# Patient Record
Sex: FEMALE | Race: WHITE | NOT HISPANIC OR LATINO | Employment: FULL TIME | URBAN - METROPOLITAN AREA
[De-identification: names, ages, dates, MRNs, and addresses within clinical notes are randomized per-mention and may not be internally consistent; named-entity substitution may affect disease eponyms.]

---

## 2018-01-02 PROBLEM — E78.1 HYPERTRIGLYCERIDEMIA: Status: ACTIVE | Noted: 2018-01-02

## 2018-07-16 PROBLEM — E55.9 VITAMIN D DEFICIENCY: Status: ACTIVE | Noted: 2018-07-16

## 2018-07-16 PROBLEM — E88.819 INSULIN RESISTANCE: Status: ACTIVE | Noted: 2018-07-16

## 2018-07-16 PROBLEM — K21.9 GASTROESOPHAGEAL REFLUX DISEASE: Status: ACTIVE | Noted: 2018-07-16

## 2018-07-16 PROBLEM — I10 ESSENTIAL HYPERTENSION, BENIGN: Status: ACTIVE | Noted: 2018-07-16

## 2018-07-16 PROBLEM — Z11.59 ENCOUNTER FOR HEPATITIS C SCREENING TEST FOR LOW RISK PATIENT: Status: ACTIVE | Noted: 2018-07-16

## 2018-07-16 PROBLEM — Z00.00 ANNUAL PHYSICAL EXAM: Status: ACTIVE | Noted: 2018-07-16

## 2018-07-16 PROBLEM — Z00.00 ROUTINE GENERAL MEDICAL EXAMINATION AT A HEALTH CARE FACILITY: Status: ACTIVE | Noted: 2018-07-16

## 2018-07-30 PROBLEM — E03.9 ACQUIRED HYPOTHYROIDISM: Status: ACTIVE | Noted: 2018-07-30

## 2018-09-25 ENCOUNTER — TELEPHONE (OUTPATIENT)
Dept: RADIOLOGY | Facility: HOSPITAL | Age: 58
End: 2018-09-25

## 2018-09-26 ENCOUNTER — HOSPITAL ENCOUNTER (OUTPATIENT)
Dept: RADIOLOGY | Facility: HOSPITAL | Age: 58
Discharge: HOME | End: 2018-09-26
Attending: INTERNAL MEDICINE
Payer: COMMERCIAL

## 2018-09-26 DIAGNOSIS — G54.2 CERVICAL SYNDROME: ICD-10-CM

## 2018-09-26 DIAGNOSIS — G62.9 NEUROPATHY: ICD-10-CM

## 2018-10-15 PROBLEM — Z00.00 ANNUAL PHYSICAL EXAM: Status: RESOLVED | Noted: 2018-07-16 | Resolved: 2018-10-15

## 2018-10-15 PROBLEM — Z00.00 ROUTINE GENERAL MEDICAL EXAMINATION AT A HEALTH CARE FACILITY: Status: RESOLVED | Noted: 2018-07-16 | Resolved: 2018-10-15

## 2018-11-07 PROBLEM — Z01.818 PREOPERATIVE CLEARANCE: Status: ACTIVE | Noted: 2018-11-07

## 2018-11-07 PROBLEM — G54.2 CERVICAL SYNDROME: Status: ACTIVE | Noted: 2018-11-07

## 2018-11-07 PROBLEM — J40 BRONCHITIS: Status: ACTIVE | Noted: 2018-11-07

## 2018-11-07 PROBLEM — I24.0 BLOCKAGE OF CORONARY ARTERY OF HEART: Status: ACTIVE | Noted: 2018-11-07

## 2019-05-29 PROBLEM — B37.2 YEAST DERMATITIS: Status: ACTIVE | Noted: 2019-05-29

## 2019-06-24 PROBLEM — J01.00 ACUTE NON-RECURRENT MAXILLARY SINUSITIS: Status: ACTIVE | Noted: 2019-06-24

## 2019-08-01 PROBLEM — Z01.818 PREOPERATIVE CLEARANCE: Status: RESOLVED | Noted: 2018-11-07 | Resolved: 2019-08-01

## 2019-08-01 PROBLEM — K21.9 GASTROESOPHAGEAL REFLUX DISEASE: Status: RESOLVED | Noted: 2018-07-16 | Resolved: 2019-08-01

## 2019-08-11 PROBLEM — J01.00 ACUTE NON-RECURRENT MAXILLARY SINUSITIS: Status: RESOLVED | Noted: 2019-06-24 | Resolved: 2019-08-11

## 2019-08-11 PROBLEM — E83.52 HYPERCALCEMIA: Status: ACTIVE | Noted: 2019-08-11

## 2019-08-11 PROBLEM — J40 BRONCHITIS: Status: RESOLVED | Noted: 2018-11-07 | Resolved: 2019-08-11

## 2019-08-11 PROBLEM — Z11.59 ENCOUNTER FOR HEPATITIS C SCREENING TEST FOR LOW RISK PATIENT: Status: RESOLVED | Noted: 2018-07-16 | Resolved: 2019-08-11

## 2020-04-09 PROBLEM — R80.9 ALBUMINURIA: Status: ACTIVE | Noted: 2020-04-09

## 2021-04-28 ENCOUNTER — PATIENT MESSAGE (OUTPATIENT)
Dept: RESEARCH | Facility: HOSPITAL | Age: 61
End: 2021-04-28

## 2022-04-01 PROBLEM — K76.0 FATTY LIVER: Status: ACTIVE | Noted: 2022-04-01

## 2022-12-19 PROBLEM — N18.2 STAGE 2 CHRONIC KIDNEY DISEASE: Status: ACTIVE | Noted: 2022-12-19

## 2023-03-30 PROBLEM — R80.9 ALBUMINURIA: Status: RESOLVED | Noted: 2020-04-09 | Resolved: 2023-03-30

## 2023-03-30 PROBLEM — E83.52 HYPERCALCEMIA: Status: RESOLVED | Noted: 2019-08-11 | Resolved: 2023-03-30

## 2023-07-26 PROBLEM — E11.8 TYPE 2 DIABETES MELLITUS WITH UNSPECIFIED COMPLICATIONS: Status: ACTIVE | Noted: 2023-07-26

## 2023-07-26 PROBLEM — E66.01 MORBID OBESITY: Status: ACTIVE | Noted: 2023-07-26

## 2023-07-26 PROBLEM — N18.31 CHRONIC KIDNEY DISEASE, STAGE 3A: Status: ACTIVE | Noted: 2023-07-26

## 2024-04-16 PROBLEM — E11.22 TYPE 2 DIABETES MELLITUS WITH STAGE 3A CHRONIC KIDNEY DISEASE, WITHOUT LONG-TERM CURRENT USE OF INSULIN: Status: ACTIVE | Noted: 2023-07-26

## 2024-04-16 PROBLEM — N18.31 TYPE 2 DIABETES MELLITUS WITH STAGE 3A CHRONIC KIDNEY DISEASE, WITHOUT LONG-TERM CURRENT USE OF INSULIN: Status: ACTIVE | Noted: 2023-07-26

## 2024-04-23 PROBLEM — Z98.1 S/P CERVICAL SPINAL FUSION: Status: ACTIVE | Noted: 2023-08-11

## 2024-04-23 PROBLEM — N18.31 TYPE 2 DIABETES MELLITUS WITH STAGE 3A CHRONIC KIDNEY DISEASE, WITHOUT LONG-TERM CURRENT USE OF INSULIN: Status: RESOLVED | Noted: 2023-07-26 | Resolved: 2024-04-23

## 2024-04-23 PROBLEM — E11.22 TYPE 2 DIABETES MELLITUS WITH STAGE 3A CHRONIC KIDNEY DISEASE, WITHOUT LONG-TERM CURRENT USE OF INSULIN: Status: RESOLVED | Noted: 2023-07-26 | Resolved: 2024-04-23

## 2024-06-03 PROBLEM — M54.17 LUMBOSACRAL RADICULOPATHY: Status: ACTIVE | Noted: 2019-08-20

## 2024-06-03 PROBLEM — J30.9 ALLERGIC RHINITIS: Status: ACTIVE | Noted: 2024-06-03

## 2024-08-16 ENCOUNTER — HOSPITAL ENCOUNTER (OUTPATIENT)
Dept: RADIOLOGY | Facility: HOSPITAL | Age: 64
Discharge: HOME | End: 2024-08-16
Attending: INTERNAL MEDICINE
Payer: COMMERCIAL

## 2024-08-16 DIAGNOSIS — R07.9 ACUTE CHEST PAIN: ICD-10-CM

## 2024-08-16 DIAGNOSIS — R07.9 ACUTE CHEST PAIN: Primary | ICD-10-CM

## 2024-08-16 PROCEDURE — 71275 CT ANGIOGRAPHY CHEST: CPT | Mod: TC

## 2024-08-16 PROCEDURE — 25500020 PHARM REV CODE 255: Performed by: INTERNAL MEDICINE

## 2024-08-16 PROCEDURE — 71275 CT ANGIOGRAPHY CHEST: CPT | Mod: 26,,, | Performed by: STUDENT IN AN ORGANIZED HEALTH CARE EDUCATION/TRAINING PROGRAM

## 2024-08-16 RX ADMIN — IOHEXOL 100 ML: 350 INJECTION, SOLUTION INTRAVENOUS at 08:08

## 2024-08-25 PROBLEM — R91.1 PULMONARY NODULE LESS THAN 6 MM IN DIAMETER WITH LOW RISK FOR MALIGNANT NEOPLASM: Status: ACTIVE | Noted: 2024-08-25

## 2024-08-25 PROBLEM — Z91.89 PULMONARY NODULE LESS THAN 6 MM IN DIAMETER WITH LOW RISK FOR MALIGNANT NEOPLASM: Status: ACTIVE | Noted: 2024-08-25

## 2024-08-25 NOTE — PROGRESS NOTES
Erick,  The CT is normal  There is a small pulmonary nodule that does not need further evaluation  How are your symptoms?

## 2024-09-04 ENCOUNTER — PATIENT MESSAGE (OUTPATIENT)
Dept: PULMONOLOGY | Facility: CLINIC | Age: 64
End: 2024-09-04
Payer: COMMERCIAL

## 2024-09-13 ENCOUNTER — TELEPHONE (OUTPATIENT)
Dept: PULMONOLOGY | Facility: CLINIC | Age: 64
End: 2024-09-13
Payer: COMMERCIAL

## 2024-09-13 DIAGNOSIS — Z91.89 PULMONARY NODULE LESS THAN 6 MM IN DIAMETER WITH LOW RISK FOR MALIGNANT NEOPLASM: Primary | ICD-10-CM

## 2024-09-13 DIAGNOSIS — R06.02 SOB (SHORTNESS OF BREATH): ICD-10-CM

## 2024-09-13 DIAGNOSIS — R91.1 PULMONARY NODULE LESS THAN 6 MM IN DIAMETER WITH LOW RISK FOR MALIGNANT NEOPLASM: Primary | ICD-10-CM

## 2024-09-13 NOTE — TELEPHONE ENCOUNTER
Called pt to schedule PFT prior to pts appt with pulmonary provider, Pt wanted same day appt for pft as pulm appt and to keep pulm appt as it was scheduled, only availability for PFT would be at 08:30am and pts appt time was at 10am, Pt asked how long PFT would take, I replied it should take about 45 minutes but can take a little longer sometimes depending on patient and setup and all, pt stated that was fine and she doesn't mind waiting

## 2024-09-25 NOTE — PROGRESS NOTES
Chief complaint:  Chief Complaint   Patient presents with    Shortness of Breath    Wheezing        History of present illness -  Erick comes today complaining of upper respiratory symptoms.  She carries a diagnosis of asthma for the last 20+ years and has been managed with ICS/LABA and LTRA for many years, asthma history is not 100% convincing but possible.   She complained of runny nose and congestion as the main symptoms as well as the constant need to clear her throat, chest pain and pressure was also mentioned and has had steroids and empiric Abx in the past with some relief of her symptom.   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   At some point also was told by cardiology that she has vasospastic angina.        Symptoms: Cough and Wheezing  Risk factors: N/A  Known triggers: Dust  Apparent phenotype: Uncertain  Tobacco use: Lifetime non-smoker    Physical examination -   Physical Exam  Vitals and nursing note reviewed.   Constitutional:       Appearance: Normal appearance.   HENT:      Head: Normocephalic and atraumatic.      Nose: Nose normal.      Mouth/Throat:      Mouth: Mucous membranes are moist.   Eyes:      Pupils: Pupils are equal, round, and reactive to light.   Cardiovascular:      Rate and Rhythm: Normal rate and regular rhythm.      Pulses: Normal pulses.      Heart sounds: Normal heart sounds.   Pulmonary:      Effort: Pulmonary effort is normal.      Breath sounds: Normal breath sounds.   Abdominal:      General: Abdomen is flat.      Palpations: Abdomen is soft.   Musculoskeletal:         General: No swelling.   Skin:     General: Skin is warm.      Capillary Refill: Capillary refill takes less than 2 seconds.   Neurological:      General: No focal deficit present.      Mental Status: She is alert.        Vitals:    09/26/24 0915   BP: 126/78   Pulse: 70   Resp: 20   SpO2: 95%   Weight: 108.6 kg (239 lb 6.4  "oz)   Height: 5' 4" (1.626 m)      Review of Systems   Constitutional: Negative.    HENT:  Positive for congestion, sinus pain and sore throat.    Eyes: Negative.    Respiratory:  Positive for cough, shortness of breath and wheezing.    Cardiovascular: Negative.    Gastrointestinal: Negative.    Musculoskeletal: Negative.    Skin: Negative.    Neurological: Negative.        Relevant data (Independently reviewed by me) -    Prior notes -   PCP    Spirometry -  09/26/24 Basic physiology WNL     Imaging -   08/15/24 CXR and Chest CT normal maybe some thickened airways in the lower lung zones     Assessment & Plan    Upper airway cough syndrome    Wheezing  Comments:  likely due to RAD  oral steroids prescribed  continue advair and albuterol  acute uncontrolled  Orders:  -     Ambulatory referral/consult to Pulmonology  -     Ambulatory referral/consult to ENT; Future; Expected date: 10/03/2024    SOB (shortness of breath)  Comments:  acute uncontrolled  oral abx/steroids  Orders:  -     Ambulatory referral/consult to Pulmonology    Other orders  -     levocetirizine (XYZAL) 5 MG tablet; Take 1 tablet (5 mg total) by mouth every evening.  Dispense: 30 tablet; Refill: 11  -     azelastine (ASTELIN) 137 mcg (0.1 %) nasal spray; 1 spray (137 mcg total) by Nasal route 2 (two) times daily.  Dispense: 1 mL; Refill: 3    At this time her "asthma" seems well controlled, we will have to revisit this diagnosis at some point, perhaps methacholine challenge if the patient agrees.    She will see ENT for flexible upper airway scope, in the meantime she is to continue her antihistamine and ICS spray int he hopes that this will help her symptoms, her symptoms seem more related to upper airway as opposed to lower airway given the nature, lack of objective data pointing towards asthma and lack of improvement with prednisone burst     RTC in 3 months     Anthony Evans   09/26/2024    "

## 2024-09-26 ENCOUNTER — PATIENT MESSAGE (OUTPATIENT)
Dept: OTOLARYNGOLOGY | Facility: CLINIC | Age: 64
End: 2024-09-26
Payer: COMMERCIAL

## 2024-09-26 ENCOUNTER — CLINICAL SUPPORT (OUTPATIENT)
Dept: PULMONOLOGY | Facility: CLINIC | Age: 64
End: 2024-09-26
Payer: COMMERCIAL

## 2024-09-26 ENCOUNTER — OFFICE VISIT (OUTPATIENT)
Dept: PULMONOLOGY | Facility: CLINIC | Age: 64
End: 2024-09-26
Payer: COMMERCIAL

## 2024-09-26 VITALS
DIASTOLIC BLOOD PRESSURE: 78 MMHG | OXYGEN SATURATION: 95 % | RESPIRATION RATE: 20 BRPM | WEIGHT: 239.38 LBS | HEIGHT: 64 IN | SYSTOLIC BLOOD PRESSURE: 126 MMHG | HEART RATE: 70 BPM | BODY MASS INDEX: 40.87 KG/M2

## 2024-09-26 DIAGNOSIS — R05.8 UPPER AIRWAY COUGH SYNDROME: Primary | ICD-10-CM

## 2024-09-26 DIAGNOSIS — R06.02 SOB (SHORTNESS OF BREATH): ICD-10-CM

## 2024-09-26 DIAGNOSIS — Z91.89 PULMONARY NODULE LESS THAN 6 MM IN DIAMETER WITH LOW RISK FOR MALIGNANT NEOPLASM: ICD-10-CM

## 2024-09-26 DIAGNOSIS — R06.2 WHEEZING: ICD-10-CM

## 2024-09-26 DIAGNOSIS — R91.1 PULMONARY NODULE LESS THAN 6 MM IN DIAMETER WITH LOW RISK FOR MALIGNANT NEOPLASM: ICD-10-CM

## 2024-09-26 LAB
BRPFT: NORMAL
DLCO ADJ PRE: 21.09 ML/(MIN*MMHG)
DLCO SINGLE BREATH LLN: 16.68
DLCO SINGLE BREATH PRE REF: 94.1 %
DLCO SINGLE BREATH REF: 22.41
DLCOC SBVA LLN: 3.05
DLCOC SBVA PRE REF: 127.2 %
DLCOC SBVA REF: 4.51
DLCOC SINGLE BREATH LLN: 16.68
DLCOC SINGLE BREATH PRE REF: 94.1 %
DLCOC SINGLE BREATH REF: 22.41
DLCOVA LLN: 3.05
DLCOVA PRE REF: 127.2 %
DLCOVA PRE: 5.74 ML/(MIN*MMHG*L)
DLCOVA REF: 4.51
DLVAADJ PRE: 5.74 ML/(MIN*MMHG*L)
ERV LLN: -16449.24
ERV PRE REF: 90 %
ERV REF: 0.76
FEF 25 75 LLN: 1.42
FEF 25 75 PRE REF: 61.2 %
FEF 25 75 REF: 2.82
FEV1 FVC LLN: 66
FEV1 FVC PRE REF: 97.7 %
FEV1 FVC REF: 79
FEV1 LLN: 1.8
FEV1 PRE REF: 84.1 %
FEV1 REF: 2.41
FRCPLETH LLN: 1.89
FRCPLETH PREREF: 90.9 %
FRCPLETH REF: 2.71
FVC LLN: 2.3
FVC PRE REF: 85.5 %
FVC REF: 3.08
IVC PRE: 2.42 L
IVC SINGLE BREATH LLN: 2.3
IVC SINGLE BREATH PRE REF: 78.8 %
IVC SINGLE BREATH REF: 3.08
MVV LLN: 77
MVV PRE REF: 102.7 %
MVV REF: 91
PEF LLN: 4.43
PEF PRE REF: 115.3 %
PEF REF: 6.15
PRE DLCO: 21.09 ML/(MIN*MMHG)
PRE ERV: 0.68 L
PRE FEF 25 75: 1.72 L/S
PRE FET 100: 7.42 SEC
PRE FEV1 FVC: 77.11 %
PRE FEV1: 2.03 L
PRE FRC PL: 2.47 L
PRE FVC: 2.63 L
PRE MVV: 93 L/MIN
PRE PEF: 7.09 L/S
PRE RV: 1.68 L
PRE TLC: 4.46 L
RAW LLN: 3.06
RAW PRE REF: 91.5 %
RAW PRE: 2.8 CMH2O*S/L
RAW REF: 3.06
RV LLN: 1.38
RV PRE REF: 85.6 %
RV REF: 1.96
RVTLC LLN: 31
RVTLC PRE REF: 93.1 %
RVTLC PRE: 37.61 %
RVTLC REF: 40
TLC LLN: 3.98
TLC PRE REF: 89.7 %
TLC REF: 4.97
VA PRE: 3.68 L
VA SINGLE BREATH LLN: 4.82
VA SINGLE BREATH PRE REF: 76.3 %
VA SINGLE BREATH REF: 4.82
VC LLN: 2.3
VC PRE REF: 90.4 %
VC PRE: 2.78 L
VC REF: 3.08
VTGRAWPRE: 2.91 L

## 2024-09-26 PROCEDURE — 99999 PR PBB SHADOW E&M-EST. PATIENT-LVL V: CPT | Mod: PBBFAC,,, | Performed by: STUDENT IN AN ORGANIZED HEALTH CARE EDUCATION/TRAINING PROGRAM

## 2024-09-26 PROCEDURE — 99999 PR PBB SHADOW E&M-EST. PATIENT-LVL I: CPT | Mod: PBBFAC,,,

## 2024-09-26 RX ORDER — DILTIAZEM HYDROCHLORIDE 120 MG/1
120 CAPSULE, EXTENDED RELEASE ORAL
COMMUNITY
Start: 2024-09-12

## 2024-09-26 RX ORDER — IVERMECTIN/METRONIDAZOLE/NIACI 1 %-1 %-4%
1 GEL (GRAM) TOPICAL 2 TIMES DAILY
COMMUNITY
Start: 2024-08-22

## 2024-09-26 RX ORDER — AZELASTINE 1 MG/ML
1 SPRAY, METERED NASAL 2 TIMES DAILY
Qty: 1 ML | Refills: 3 | Status: SHIPPED | OUTPATIENT
Start: 2024-09-26 | End: 2025-09-26

## 2024-09-26 RX ORDER — NITROGLYCERIN 0.4 MG/1
TABLET SUBLINGUAL
COMMUNITY
Start: 2024-09-06

## 2024-09-26 RX ORDER — LEVOCETIRIZINE DIHYDROCHLORIDE 5 MG/1
5 TABLET, FILM COATED ORAL NIGHTLY
Qty: 30 TABLET | Refills: 11 | Status: SHIPPED | OUTPATIENT
Start: 2024-09-26 | End: 2025-09-26

## 2024-09-26 RX ORDER — POTASSIUM CHLORIDE 1500 MG/1
20 TABLET, EXTENDED RELEASE ORAL
COMMUNITY
Start: 2024-09-12

## 2024-09-29 ENCOUNTER — PATIENT MESSAGE (OUTPATIENT)
Dept: PULMONOLOGY | Facility: CLINIC | Age: 64
End: 2024-09-29
Payer: COMMERCIAL

## 2024-09-30 ENCOUNTER — PATIENT MESSAGE (OUTPATIENT)
Dept: OTOLARYNGOLOGY | Facility: CLINIC | Age: 64
End: 2024-09-30

## 2024-09-30 ENCOUNTER — OFFICE VISIT (OUTPATIENT)
Dept: OTOLARYNGOLOGY | Facility: CLINIC | Age: 64
End: 2024-09-30
Payer: COMMERCIAL

## 2024-09-30 VITALS — WEIGHT: 243.38 LBS | BODY MASS INDEX: 41.55 KG/M2 | HEIGHT: 64 IN

## 2024-09-30 DIAGNOSIS — J32.4 CHRONIC PANSINUSITIS: Primary | ICD-10-CM

## 2024-09-30 DIAGNOSIS — J30.89 NON-SEASONAL ALLERGIC RHINITIS, UNSPECIFIED TRIGGER: ICD-10-CM

## 2024-09-30 DIAGNOSIS — R05.3 CHRONIC COUGH: ICD-10-CM

## 2024-09-30 PROCEDURE — 99999 PR PBB SHADOW E&M-EST. PATIENT-LVL III: CPT | Mod: PBBFAC,,, | Performed by: OTOLARYNGOLOGY

## 2024-09-30 PROCEDURE — 1160F RVW MEDS BY RX/DR IN RCRD: CPT | Mod: CPTII,S$GLB,, | Performed by: OTOLARYNGOLOGY

## 2024-09-30 PROCEDURE — 3008F BODY MASS INDEX DOCD: CPT | Mod: CPTII,S$GLB,, | Performed by: OTOLARYNGOLOGY

## 2024-09-30 PROCEDURE — 4010F ACE/ARB THERAPY RXD/TAKEN: CPT | Mod: CPTII,S$GLB,, | Performed by: OTOLARYNGOLOGY

## 2024-09-30 PROCEDURE — 3044F HG A1C LEVEL LT 7.0%: CPT | Mod: CPTII,S$GLB,, | Performed by: OTOLARYNGOLOGY

## 2024-09-30 PROCEDURE — 31231 NASAL ENDOSCOPY DX: CPT | Mod: S$GLB,,, | Performed by: OTOLARYNGOLOGY

## 2024-09-30 PROCEDURE — 99204 OFFICE O/P NEW MOD 45 MIN: CPT | Mod: 25,S$GLB,, | Performed by: OTOLARYNGOLOGY

## 2024-09-30 PROCEDURE — 87070 CULTURE OTHR SPECIMN AEROBIC: CPT | Performed by: OTOLARYNGOLOGY

## 2024-09-30 PROCEDURE — 3066F NEPHROPATHY DOC TX: CPT | Mod: CPTII,S$GLB,, | Performed by: OTOLARYNGOLOGY

## 2024-09-30 PROCEDURE — 3061F NEG MICROALBUMINURIA REV: CPT | Mod: CPTII,S$GLB,, | Performed by: OTOLARYNGOLOGY

## 2024-09-30 PROCEDURE — 1159F MED LIST DOCD IN RCRD: CPT | Mod: CPTII,S$GLB,, | Performed by: OTOLARYNGOLOGY

## 2024-09-30 RX ORDER — AMOXICILLIN AND CLAVULANATE POTASSIUM 875; 125 MG/1; MG/1
1 TABLET, FILM COATED ORAL 2 TIMES DAILY
Qty: 20 TABLET | Refills: 0 | Status: SHIPPED | OUTPATIENT
Start: 2024-09-30 | End: 2024-10-10

## 2024-09-30 NOTE — PROGRESS NOTES
"Referring Provider:    No referring provider defined for this encounter.  Subjective:   Patient: Erick Polanco [id number], :1960   Visit date:2024 8:24 AM    Chief Complaint:  Sinus Problem (Pt states started a month ago. Couldnr breathe well and heavy feeling in chest. Dx with vascular muscle spasms.Sent to Pulmonology and put on Levocetirizine. 25 years ago had sinuses rebuilt. On allergy shots 2x week. Dr. Evans would like her scoped.C/o congestion, runny nose and cough. C/o wheezing and coughing up clear mucus.)    HPI:    Prior notes reviewed by myself.  Clinical documentation obtained by nursing staff reviewed.     63 y/o female here for evaluation of chronic sinusitis.  She has a long history of sinus issues and previous sinus surgery.  She is also seeing pulmonary for shortness of breath and was recently diagnosed with "upper airway cough syndrome".  She is currently receiving SCIT through an outside ENT office.  She has a history of ACDF as well performed in .  She does report intermittent sinus symptoms including rhinorrhea and postnasal drip.        Objective:     Physical Exam:  Vitals:  Ht 5' 4" (1.626 m)   Wt 110.4 kg (243 lb 6.2 oz)   BMI 41.78 kg/m²   General appearance:  Well developed, well nourished    Ears:  Otoscopy of external auditory canals and tympanic membranes was normal, clinical speech reception thresholds grossly intact, no mass/lesion of auricle.    Nose:  No masses/lesions of external nose, nasal mucosa, septum, and turbinates were within normal limits.    Mouth:  No mass/lesion of lips, teeth, gums, hard/soft palate, tongue, tonsils, or oropharynx.    Neck & Lymphatics:  No cervical lymphadenopathy, no neck mass/crepitus/ asymmetry, trachea is midline, no thyroid enlargement/tenderness/mass.    PROCEDURE NOTE:  Diagnostic nasal endoscopy  Preprocedure diagnosis:  Chronic sinusitis  Postprocedure diangosis:  Same  Complications:  None  Blood Loss:  " None    Procedure in detail:  After verbal consent was obtained, the patient's nasal cavity was anesthesized using topical Tetracaine and Neosynepherine.  A rigid 30 degree endoscope was placed in first the right, then the left nasal cavity.  The inferior and middle turbinates were examined, and found to be normal bilaterally.  The middle meatus and maxillary antrum was also examined, and found to have postoperative changes and purulent drainage bilaterally. The superior meatus as well as the sphenoethmoid recess were also inspected and noted have purulent drainage.  Culture was obtained from right middle meatus. The oropharynx, hypopharynx, endolarynx were also visualized (using a flexible endoscope) and she was noted to have bilateral symmetric TVC mobility with no visible lesions.  The patient tolerated the procedure well and there were no complications.      [x]  Data Reviewed:    Lab Results   Component Value Date    WBC 6.9 04/19/2024    HGB 12.6 04/19/2024    HCT 42.8 04/19/2024    MCV 63 (L) 04/19/2024    EOSINOPHIL 1 04/19/2024     CTA Chest Non-Coronary (PE Studies)  Order: [id number]  Status: Final result       Visible to patient: Yes (seen)       Next appt: 10/18/2024 at 09:30 AM in Internal Medicine (Fara Mayo MD)       Dx: Acute chest pain    1 Result Note       1 Patient Communication  Details    Reading Physician Reading Date Result Priority   Mamadou Cortes MD  [phone] 8/16/2024 Routine     Narrative & Impression  EXAMINATION:  CTA CHEST NON CORONARY (PE STUDIES)     CLINICAL HISTORY:  Pulmonary embolism (PE) suspected, unknown D-dimer; Chest pain, unspecified     TECHNIQUE:  Low dose axial images, sagittal and coronal reformations were obtained from the thoracic inlet to the lung bases following the IV administration of 100 mL of Omnipaque 350.  Contrast timing was optimized to evaluate the pulmonary arteries.     COMPARISON:  None     FINDINGS:  Base of neck and thoracic soft  tissues are unremarkable.     Thoracic aorta is normal caliber.  Heart is normal size.  No pericardial effusion.     Opacification of the pulmonary arterial system.  No evidence of pulmonary thromboembolism to the proximal segmental level.     No pathologically enlarged thoracic lymph nodes.     Central airways are clear.  4 mm nodule in the left upper lobe (series 3, image 182).  Otherwise lungs are clear without consolidation, pleural effusion, or pneumothorax.     Diffusely hypodense hepatic parenchyma suggestive for steatosis.  Cholecystectomy.     Degenerative changes of the spine.  Partially visualized cervical fusion hardware.  No acute fracture or aggressive bone lesion.     Impression:     1. No evidence of pulmonary thromboembolism to the proximal segmental level.  No acute cardiopulmonary process.  2. 4 mm nodule in the left upper lobe.  For a solid nodule <6 mm, Fleischner Society 2017 guidelines recommend no routine follow up for a low risk patient, or follow-up with non-contrast chest CT at 12 months in a high risk patient.  3. Probable hepatic steatosis.        Electronically signed by:Mamadou Cortes  Date:                                            08/16/2024  Time:                                           08:57       Reviewed notes from Dr. Evans    Assessment & Plan:   Chronic pansinusitis  -     amoxicillin-clavulanate 875-125mg (AUGMENTIN) 875-125 mg per tablet; Take 1 tablet by mouth 2 (two) times daily. for 10 days  Dispense: 20 tablet; Refill: 0  -     CULTURE, AEROBIC  (SPECIFY SOURCE)    Chronic cough    Non-seasonal allergic rhinitis, unspecified trigger       She has normal, symmetric, bilateral true vocal cord mobility.  She has a history of prior sinus surgery and is on immunotherapy currently.  She has evidence of mucopurulent drainage bilaterally from her sinus cavities, especially on the right side.  We agreed to start her on an antibiotic and we have also obtained a culture of the  "drainage.  There is a good chance that this could be a significant contributor to her recent issues with chronic cough.  She is going to follow-up with her established ENT and we did briefly discuss the possible need for more extended antibiotics and/or even revision sinus surgery if medical therapy was not effective.  She understands and agrees with the plan    Dr. Coelho's Sinus Protocol    Sinusitis is caused by inflammation creating blockage in the natural drainage pathways of the sinuses.  This "Sinus Protocol" is designed to open, flush out and decrease the inflammation within those pathways.      Day 1-3 (Perform 2x per day)     Afrin (pump spray mist OTC) 2 sprays in each nostril   [facility] Sinus Wash - Make sure you use distilled water!  Fluticasone nasal spray 2 sprays in each nostril      Days 4 - follow up visit (perform 1x per day)    [facility] Sinus wash  Fluticasone nasal spray 2 sprays in each nostril        *TAKE ALL OTHER MEDICATIONS AS DIRECTED BY DR COELHO  *MAKE SURE YOU STOP USING AFRIN AFTER THE 3RD DAY AS THERE IS A RISK OF BECOMING DEPENDENT ON THAT MEDICATION    Fantasma Coelho M.D.  Department of Otolaryngology - Head & Neck Surgery  [id number] [place] [address].  [place] [address]  P: [phone]  F: [phone]        DISCLAIMER: This note was prepared with [employer] voice recognition transcription software. Garbled syntax, mangled pronouns, and other bizarre constructions may be attributed to that software system. While efforts were made to correct any mistakes made by this voice recognition program, some errors and/or omissions may remain in the note that were missed when the note was originally created.     "

## 2024-10-04 LAB — BACTERIA SPEC AEROBE CULT: NO GROWTH

## 2024-11-19 ENCOUNTER — PATIENT MESSAGE (OUTPATIENT)
Dept: NEUROLOGY | Facility: CLINIC | Age: 64
End: 2024-11-19
Payer: COMMERCIAL

## 2024-11-20 ENCOUNTER — TELEPHONE (OUTPATIENT)
Dept: PHARMACY | Facility: CLINIC | Age: 64
End: 2024-11-20
Payer: COMMERCIAL

## 2024-11-20 NOTE — TELEPHONE ENCOUNTER
[facility]/Population Health Chart Review & Patient Outreach Details For Medication Adherence Project    Reason for Outreach Encounter: 3rd Party payor non-compliance report ([employer], [employer], C, etc)  2.  Patient Outreach Method: Reviewed Patient Chart  3.   Medication in question: olmesartan   LAST FILLED: n/a  Hypertension Medications               CARTIA  mg Cp24 Take 120 mg by mouth.    furosemide (LASIX) 40 MG tablet Take 40 mg by mouth.    nitroGLYCERIN (NITROSTAT) 0.4 MG SL tablet PLACE 1 TABLET UNDER TONGUE EVERY 5 MINUTES AS NEEDED FOR CHEST PAIN. IF CHEST PAIN PERSIST AFTER 3 DOSES, CONTACT PHYSICIAN.              4.  Reviewed and or Updates Made To: Patient Chart  5. Outreach Outcomes and/or actions taken: Medication discontinued    Additional Notes:

## 2024-12-10 ENCOUNTER — TELEPHONE (OUTPATIENT)
Dept: PULMONOLOGY | Facility: CLINIC | Age: 64
End: 2024-12-10
Payer: COMMERCIAL

## 2024-12-11 ENCOUNTER — TELEPHONE (OUTPATIENT)
Dept: PULMONOLOGY | Facility: CLINIC | Age: 64
End: 2024-12-11
Payer: COMMERCIAL

## 2024-12-11 NOTE — TELEPHONE ENCOUNTER
Returned call unable to reach patient , LVM to call back or go to my chart to reschedule appt to a better time and date that works for her.

## 2024-12-11 NOTE — TELEPHONE ENCOUNTER
----- Message from Adamaris sent at 12/10/2024  4:09 PM CST -----  Contact: self  [phone]  Type:  Patient Returning Call    Who Called:Erick  Who Left Message for Patient:Lawanda  Does the patient know what this is regarding?:appointment time  Would the patient rather a call back or a response via [facility]? Call back   Best Call Back Number: [phone]  Additional Information: patient called back stating the time change does not work for her. Please call back  [phone]. Thanks tpkirill

## 2024-12-19 ENCOUNTER — OFFICE VISIT (OUTPATIENT)
Dept: PULMONOLOGY | Facility: CLINIC | Age: 64
End: 2024-12-19
Payer: COMMERCIAL

## 2024-12-19 VITALS
WEIGHT: 241.38 LBS | DIASTOLIC BLOOD PRESSURE: 74 MMHG | HEART RATE: 80 BPM | OXYGEN SATURATION: 98 % | RESPIRATION RATE: 11 BRPM | SYSTOLIC BLOOD PRESSURE: 118 MMHG | BODY MASS INDEX: 40.22 KG/M2 | HEIGHT: 65 IN

## 2024-12-19 DIAGNOSIS — J32.4 CHRONIC PANSINUSITIS: ICD-10-CM

## 2024-12-19 DIAGNOSIS — J45.20 MILD INTERMITTENT ASTHMA WITHOUT COMPLICATION: Primary | ICD-10-CM

## 2024-12-19 DIAGNOSIS — R05.8 UPPER AIRWAY COUGH SYNDROME: ICD-10-CM

## 2024-12-19 PROCEDURE — 3066F NEPHROPATHY DOC TX: CPT | Mod: CPTII,S$GLB,, | Performed by: STUDENT IN AN ORGANIZED HEALTH CARE EDUCATION/TRAINING PROGRAM

## 2024-12-19 PROCEDURE — 1159F MED LIST DOCD IN RCRD: CPT | Mod: CPTII,S$GLB,, | Performed by: STUDENT IN AN ORGANIZED HEALTH CARE EDUCATION/TRAINING PROGRAM

## 2024-12-19 PROCEDURE — 4010F ACE/ARB THERAPY RXD/TAKEN: CPT | Mod: CPTII,S$GLB,, | Performed by: STUDENT IN AN ORGANIZED HEALTH CARE EDUCATION/TRAINING PROGRAM

## 2024-12-19 PROCEDURE — 3078F DIAST BP <80 MM HG: CPT | Mod: CPTII,S$GLB,, | Performed by: STUDENT IN AN ORGANIZED HEALTH CARE EDUCATION/TRAINING PROGRAM

## 2024-12-19 PROCEDURE — 3008F BODY MASS INDEX DOCD: CPT | Mod: CPTII,S$GLB,, | Performed by: STUDENT IN AN ORGANIZED HEALTH CARE EDUCATION/TRAINING PROGRAM

## 2024-12-19 PROCEDURE — 99999 PR PBB SHADOW E&M-EST. PATIENT-LVL V: CPT | Mod: PBBFAC,,, | Performed by: STUDENT IN AN ORGANIZED HEALTH CARE EDUCATION/TRAINING PROGRAM

## 2024-12-19 PROCEDURE — 3061F NEG MICROALBUMINURIA REV: CPT | Mod: CPTII,S$GLB,, | Performed by: STUDENT IN AN ORGANIZED HEALTH CARE EDUCATION/TRAINING PROGRAM

## 2024-12-19 PROCEDURE — 3044F HG A1C LEVEL LT 7.0%: CPT | Mod: CPTII,S$GLB,, | Performed by: STUDENT IN AN ORGANIZED HEALTH CARE EDUCATION/TRAINING PROGRAM

## 2024-12-19 PROCEDURE — 3074F SYST BP LT 130 MM HG: CPT | Mod: CPTII,S$GLB,, | Performed by: STUDENT IN AN ORGANIZED HEALTH CARE EDUCATION/TRAINING PROGRAM

## 2024-12-19 PROCEDURE — 99213 OFFICE O/P EST LOW 20 MIN: CPT | Mod: S$GLB,,, | Performed by: STUDENT IN AN ORGANIZED HEALTH CARE EDUCATION/TRAINING PROGRAM

## 2024-12-19 RX ORDER — METRONIDAZOLE 7.5 MG/G
1 CREAM TOPICAL 2 TIMES DAILY
COMMUNITY
Start: 2024-08-06

## 2024-12-19 NOTE — PROGRESS NOTES
"  Chief complaint:  No chief complaint on file.       History of present illness -  [facility] [place]   Established clinic patient a few months ago for upper respiratory symptoms initially came to clinic for "asthma management" but we are unsure about this diagnosis, her main symptoms where runny nose congestion and a constant need to clear her throat, in the past she has had marginal relief with antibiotics and steroid therapy.    Interval history   12/19/2024  Comes back for follow-up seen by ENT in the interim diagnosed with pansinusitis with purulent drainage bilaterally on nasal endoscopy, she was given a course of 10 days of Augmentin as well as nasal spray with Afrin normal saline nasal wash and fluticasone.  She recently underwent sinus surgery by ENT, she is currently recovering, her symptoms are controlled.    Physical examination -   Physical Exam  Vitals and nursing note reviewed.   Constitutional:       Appearance: Normal appearance.   HENT:      Head: Normocephalic and atraumatic.      Nose: Nose normal.      Mouth/Throat:      Mouth: Mucous membranes are moist.   Eyes:      Pupils: Pupils are equal, round, and reactive to light.   Cardiovascular:      Rate and Rhythm: Normal rate and regular rhythm.      Pulses: Normal pulses.      Heart sounds: Normal heart sounds.   Pulmonary:      Effort: Pulmonary effort is normal.      Breath sounds: Normal breath sounds.   Abdominal:      General: Abdomen is flat.      Palpations: Abdomen is soft.   Musculoskeletal:         General: No swelling.   Skin:     General: Skin is warm.      Capillary Refill: Capillary refill takes less than 2 seconds.   Neurological:      General: No focal deficit present.      Mental Status: She is alert.        There were no vitals filed for this visit.   Review of Systems   Constitutional: Negative.    HENT: Negative.     Eyes: Negative.    Respiratory: Negative.     Cardiovascular: Negative.    Gastrointestinal: Negative.    Musculoskeletal: " Negative.    Skin: Negative.    Neurological: Negative.        Relevant data (Independently reviewed by me) -    Prior notes -   PCP, ENT     Spirometry -  09/26/24 Basic physiology WNL      Imaging -   08/15/24 CXR and Chest CT normal maybe some thickened airways in the lower lung zones     Assessment & Plan    Mild intermittent asthma without complication    Chronic pansinusitis    Upper airway cough syndrome    Initial impression was probably correct, unlikely this patient has asthma and if she does it will be very mild, she can continue using her maintenance inhaler as well as rescue if she perceive benefits from ongoing use.  She is status post  sinus surgery as well as antibiotic therapy for her chronic pansinusitis.  She is to continue to use her nasal sprays.  At some point might consider doing methacholine challenge to rule out asthma.    RTC in 6 months    Anthony Evans   12/19/2024